# Patient Record
Sex: FEMALE | Race: WHITE | Employment: FULL TIME | URBAN - METROPOLITAN AREA
[De-identification: names, ages, dates, MRNs, and addresses within clinical notes are randomized per-mention and may not be internally consistent; named-entity substitution may affect disease eponyms.]

---

## 2018-10-25 NOTE — PROGRESS NOTES
Celeste De La Rosa presents today for   Chief Complaint   Patient presents with    New Patient     Establishing care today.  Cough     x 3 weeks without fever or chills. Patient reports some improvement    Anxiety     Patient requesting prescription refill of Ativan for anxiety. Patient states that she is not depressed, and the last time she took ativan was 2011. Depression Screening:  PHQ over the last two weeks 10/26/2018   Little interest or pleasure in doing things Nearly every day   Feeling down, depressed, irritable, or hopeless More than half the days   Total Score PHQ 2 5   Trouble falling or staying asleep, or sleeping too much Nearly every day   Feeling tired or having little energy Nearly every day   Poor appetite, weight loss, or overeating Nearly every day   Feeling bad about yourself - or that you are a failure or have let yourself or your family down Nearly every day   Trouble concentrating on things such as school, work, reading, or watching TV Several days   Moving or speaking so slowly that other people could have noticed; or the opposite being so fidgety that others notice Not at all   Thoughts of being better off dead, or hurting yourself in some way Not at all   PHQ 9 Score 18   How difficult have these problems made it for you to do your work, take care of your home and get along with others Somewhat difficult       Learning Assessment:  Learning Assessment 10/26/2018   PRIMARY LEARNER Patient   HIGHEST LEVEL OF EDUCATION - PRIMARY LEARNER  SOME COLLEGE   BARRIERS PRIMARY LEARNER NONE   CO-LEARNER CAREGIVER No   PRIMARY LANGUAGE ENGLISH   LEARNER PREFERENCE PRIMARY DEMONSTRATION   ANSWERED BY patient   RELATIONSHIP SELF       Abuse Screening:  Abuse Screening Questionnaire 10/26/2018   Do you ever feel afraid of your partner? N   Are you in a relationship with someone who physically or mentally threatens you? N   Is it safe for you to go home?  Rigo Guardado

## 2018-10-26 ENCOUNTER — OFFICE VISIT (OUTPATIENT)
Dept: INTERNAL MEDICINE CLINIC | Age: 28
End: 2018-10-26

## 2018-10-26 VITALS
TEMPERATURE: 98.2 F | HEART RATE: 77 BPM | BODY MASS INDEX: 36.7 KG/M2 | DIASTOLIC BLOOD PRESSURE: 73 MMHG | RESPIRATION RATE: 16 BRPM | WEIGHT: 199.4 LBS | HEIGHT: 62 IN | SYSTOLIC BLOOD PRESSURE: 126 MMHG

## 2018-10-26 DIAGNOSIS — J35.8 TONSILLAR EXUDATE: ICD-10-CM

## 2018-10-26 DIAGNOSIS — Z00.00 ROUTINE GENERAL MEDICAL EXAMINATION AT A HEALTH CARE FACILITY: ICD-10-CM

## 2018-10-26 DIAGNOSIS — R05.9 COUGH: ICD-10-CM

## 2018-10-26 DIAGNOSIS — R53.83 FATIGUE, UNSPECIFIED TYPE: ICD-10-CM

## 2018-10-26 DIAGNOSIS — Z00.00 ROUTINE GENERAL MEDICAL EXAMINATION AT A HEALTH CARE FACILITY: Primary | ICD-10-CM

## 2018-10-26 DIAGNOSIS — J45.901 MODERATE ASTHMA WITH EXACERBATION, UNSPECIFIED WHETHER PERSISTENT: ICD-10-CM

## 2018-10-26 DIAGNOSIS — F41.9 MODERATE ANXIETY: ICD-10-CM

## 2018-10-26 DIAGNOSIS — J02.9 SORE THROAT: ICD-10-CM

## 2018-10-26 DIAGNOSIS — R09.81 NASAL CONGESTION: ICD-10-CM

## 2018-10-26 DIAGNOSIS — F32.A MODERATELY SEVERE DEPRESSION: ICD-10-CM

## 2018-10-26 DIAGNOSIS — Z12.4 PAP SMEAR FOR CERVICAL CANCER SCREENING: ICD-10-CM

## 2018-10-26 LAB
S PYO AG THROAT QL: NORMAL
VALID INTERNAL CONTROL?: YES

## 2018-10-26 RX ORDER — ALBUTEROL SULFATE 90 UG/1
2 AEROSOL, METERED RESPIRATORY (INHALATION)
Qty: 1 INHALER | Refills: 1 | Status: SHIPPED | OUTPATIENT
Start: 2018-10-26

## 2018-10-26 RX ORDER — BENZONATATE 100 MG/1
100 CAPSULE ORAL
Qty: 30 CAP | Refills: 1 | Status: SHIPPED | OUTPATIENT
Start: 2018-10-26 | End: 2018-11-02

## 2018-10-26 RX ORDER — HYDROCODONE POLISTIREX AND CHLORPHENIRAMINE POLISTIREX 10; 8 MG/5ML; MG/5ML
5 SUSPENSION, EXTENDED RELEASE ORAL
Qty: 115 ML | Refills: 0 | Status: SHIPPED | OUTPATIENT
Start: 2018-10-26

## 2018-10-26 RX ORDER — LORAZEPAM 0.5 MG/1
TABLET ORAL
COMMUNITY
End: 2018-10-26 | Stop reason: ALTCHOICE

## 2018-10-26 RX ORDER — HYDROXYZINE PAMOATE 25 MG/1
25 CAPSULE ORAL
Qty: 60 CAP | Refills: 1 | Status: SHIPPED | OUTPATIENT
Start: 2018-10-26 | End: 2018-11-09

## 2018-10-26 RX ORDER — PREDNISONE 20 MG/1
TABLET ORAL
Qty: 10 TAB | Refills: 0 | Status: SHIPPED | OUTPATIENT
Start: 2018-10-26

## 2018-10-26 NOTE — PROGRESS NOTES
Harish Lawler is a 34 y.o.  female and presents with    Chief Complaint   Patient presents with    New Patient     Establishing care today.  Cough     x 3 weeks without fever or chills. Patient reports some improvement    Anxiety     Patient requesting prescription refill of Ativan for anxiety. Patient states that she is not depressed, and the last time she took ativan was 2011. Subjective:  HPI   Mrs. Elisa Durant presents today to establish care. She lives with her . 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 Her  works for technical services. She has a history of asthma, anxiety, and obesity. She reports history of generalized anxiety related to work stress from November 2017-May 2018. She was placed on Celexa. She reports rare occurrence of anxiety now that she has quit her corporate job and working from home. She is asking for an as needed anxiety medication. She took Ativan in the past with relief. Mrs. Elisa Durant presents today with x 2 weeks of coughing. First week was tired, chest tightness, unable to get out of bed. The cough is persistent improved over the last two weeks, cough has always been nonproductive. Denies fever, chills, gi symptoms. She has been taking Dayquil and Nyquil with some relief, helped sleep. She does not have an inhaler. HX of asthma, COPD, denies hx smoking. Her parents did smoke. She does work with chickens. Asthma  Patient presents for evaluation of wheezing, non-productive cough and chest tightness. The patient has been previously diagnosed with asthma. Symptoms currently include wheezing, non-productive cough and chest tightness and occur daily. Observed precipitants include infection. Does she do nebulizer treatments? no   Does she use an inhaler? yes and needs refill   Does she use a spacer w/MDIs? no  Does she monitor peak flow rates? no     Nighttime awakenings?  yes Interference with normal activity? yes   PFTs performed? no    Followed by a pulmonary specialist? 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 Would like a referral to gynecology. She is overdue for pap smear. She reports mother with a form of uterine cancer with hysterectomy. Additional Concerns: none     ROS   Review of Systems   Constitutional: Positive for malaise/fatigue. Negative for chills and fever. HENT: Negative. Eyes: Negative. Respiratory: Positive for cough, shortness of breath and wheezing. Negative for hemoptysis and sputum production. Cardiovascular: Negative. Gastrointestinal: Negative. Genitourinary: Negative. Musculoskeletal: Negative. Skin: Negative. Neurological: Negative. Endo/Heme/Allergies: Positive for environmental allergies. Psychiatric/Behavioral: Negative for depression, hallucinations, memory loss, substance abuse and suicidal ideas. The patient is nervous/anxious. The patient does not have insomnia. No Known Allergies    Current Outpatient Medications   Medication Sig Dispense Refill    albuterol (PROVENTIL HFA, VENTOLIN HFA, PROAIR HFA) 90 mcg/actuation inhaler Take 2 Puffs by inhalation every four (4) hours as needed for Wheezing. 1 Inhaler 1    benzonatate (TESSALON) 100 mg capsule Take 1 Cap by mouth three (3) times daily as needed for Cough for up to 7 days. 30 Cap 1    chlorpheniramine-HYDROcodone (TUSSIONEX) 10-8 mg/5 mL suspension Take 5 mL by mouth every twelve (12) hours as needed for Cough. Max Daily Amount: 10 mL. 115 mL 0    predniSONE (DELTASONE) 20 mg tablet 2 tablets by mouth for five days 10 Tab 0    hydrOXYzine pamoate (VISTARIL) 25 mg capsule Take 1 Cap by mouth three (3) times daily as needed for Anxiety for up to 14 days. 60 Cap 1    inhalational spacing device 1 Each by Does Not Apply route as needed.  1 Device 0       Social History     Socioeconomic History    Marital status:      Spouse name: Not on file    Number of children: Not on file    Years of education: Not on file    Highest education level: Not on file   Social Needs    Financial resource strain: Not on file    Food insecurity - worry: Not on file    Food insecurity - inability: Not on file    Transportation needs - medical: Not on file   [employer] needs - non-medical: Not on file   Occupational History    Not on file   Tobacco Use    Smoking status: Current Every Day Smoker     Years: 0.00    Smokeless tobacco: Never Used   Substance and Sexual Activity    Alcohol use: No    Drug use: No    Sexual activity: Yes     Partners: Male     Birth control/protection: Condom   Other Topics Concern    Not on file   Social History Narrative    Not on file       Past Medical History:   Diagnosis Date    Asthma     Double, uterus     History of seasonal allergies     Morbid obesity ([facility] [address]     Pneumonia     Psoriasis of scalp        No past surgical history on file. Family History   Problem Relation Age of Onset    Diabetes Mother     Hypertension Mother     Heart Disease Mother     Cancer Mother         hysterectomy, cancer?  Heart Disease Father         hx of MI, 54    Heart Disease Maternal Uncle     Lung Disease Maternal Grandmother     Cancer Maternal Grandfather         mesothelioma       Objective:  Vitals:    10/26/18 0856   BP: 126/73   Pulse: 77   Resp: 16   Temp: 98.2 °F (36.8 °C)   TempSrc: Oral   Weight: 199 lb 6.4 oz (90.4 kg)   Height: 5' 2\" (1.575 m)   PainSc:   0 - No pain       LABS   none    TESTS  none    PE  Physical Exam   Constitutional: She is oriented to person, place, and time. She appears well-developed and well-nourished. No distress. HENT:   Head: Normocephalic and atraumatic. Right Ear: External ear normal.   Left Ear: External ear normal.   Mouth/Throat: Oropharyngeal exudate present.    Erythematous oropharynx with exudate, no cervical lymphadenopathy, boggy turbinates right worse than left with drainage noted   Eyes: Conjunctivae and EOM are normal. Pupils are equal, round, and reactive to light. Right eye exhibits no discharge. Left eye exhibits no discharge. Neck: Normal range of motion. Cardiovascular: Normal rate, regular rhythm, normal heart sounds and intact distal pulses. Pulmonary/Chest: No tachypnea and no bradypnea. She has no decreased breath sounds. She has wheezes in the right upper field, the right middle field and the right lower field. She has no rales. She exhibits no tenderness. Abdominal: Soft. Bowel sounds are normal.   Musculoskeletal: Normal range of motion. Lymphadenopathy:     She has no cervical adenopathy. Neurological: She is alert and oriented to person, place, and time. No cranial nerve deficit. Skin: Skin is warm and dry. She is not diaphoretic. Psychiatric: She has a normal mood and affect. Her behavior is normal. Judgment and thought content normal.   Vitals reviewed. Assessment/Plan:    1. Establish care- Labs today, CPE in two weeks, referral to [facility] for routine screening paps, reports double uterus, mother with hysterectomy secondary to cancer. 2. Cough, cold like symptoms, fatigue, wheezing x 2 weeks- Asthma exacerbation with diffuse expiratory wheezing to Right lung fields prescribed Albuterol with spacer,  Prednisone 40 mg x 5 days. She is with exudate to oropharynx Rapid strep negative, throat culture sent, Ebstein kwan ordered may have overlapping mononucleosis. Symptomatic treatment discussed. She has a history of environmental allergies as well and may need preventative treatment in future, will get PFTs when better. She does work with chickens so need to keep this in mind if no resolution. 3. Obesity- will discuss further on follow ups. 4. Depression/Anxiety- Will place the patient on Atarax PRN anxiety for now. Patient to fill out PHQ9 and GAD7 in office, will discuss at follow up.  PHQ9 18, moderate severe depression, ARTURO 7- 10 moderate anxiety, I feel she would do better on maintenance medication with this screening, will discuss at follow up. Lab review: orders written for new lab studies as appropriate; see orders    Today's Visit:   Diagnoses and all orders for this visit:    1. Routine general medical examination at a health care facility  -     CBC WITH AUTOMATED DIFF; Future  -     METABOLIC PANEL, COMPREHENSIVE; Future  -     HEMOGLOBIN A1C WITH EAG; Future  -     LIPID PANEL; Future  -     TSH 3RD GENERATION; Future  -     URINALYSIS W/ RFLX MICROSCOPIC; Future    2. Moderate asthma with exacerbation, unspecified whether persistent  -     albuterol (PROVENTIL HFA, VENTOLIN HFA, PROAIR HFA) 90 mcg/actuation inhaler; Take 2 Puffs by inhalation every four (4) hours as needed for Wheezing.  -     benzonatate (TESSALON) 100 mg capsule; Take 1 Cap by mouth three (3) times daily as needed for Cough for up to 7 days. -     chlorpheniramine-HYDROcodone (TUSSIONEX) 10-8 mg/5 mL suspension; Take 5 mL by mouth every twelve (12) hours as needed for Cough. Max Daily Amount: 10 mL. -     predniSONE (DELTASONE) 20 mg tablet; 2 tablets by mouth for five days  -     inhalational spacing device; 1 Each by Does Not Apply route as needed. 3. Cough  -     albuterol (PROVENTIL HFA, VENTOLIN HFA, PROAIR HFA) 90 mcg/actuation inhaler; Take 2 Puffs by inhalation every four (4) hours as needed for Wheezing.  -     benzonatate (TESSALON) 100 mg capsule; Take 1 Cap by mouth three (3) times daily as needed for Cough for up to 7 days. -     chlorpheniramine-HYDROcodone (TUSSIONEX) 10-8 mg/5 mL suspension; Take 5 mL by mouth every twelve (12) hours as needed for Cough. Max Daily Amount: 10 mL. 4. Nasal congestion  -     chlorpheniramine-HYDROcodone (TUSSIONEX) 10-8 mg/5 mL suspension; Take 5 mL by mouth every twelve (12) hours as needed for Cough. Max Daily Amount: 10 mL.     5. Sore throat  -     HEATHER BARR VIRUS AB PANEL; Future    6. Tonsillar exudate  -     AMB POC RAPID STREP A  -     THROAT CULTURE; Future  -     HEATHER BARR VIRUS AB PANEL; Future    7. Fatigue, unspecified type  -     HEATHER BARR VIRUS AB PANEL; Future    8. Pap smear for cervical cancer screening  -     REFERRAL TO GYNECOLOGY    9. Moderately severe depression ([address]    10. Moderate anxiety    Other orders  -     hydrOXYzine pamoate (VISTARIL) 25 mg capsule; Take 1 Cap by mouth three (3) times daily as needed for Anxiety for up to 14 days. Health Maintenance: She reports influenza completed at prior employer this year. She reports received HPV vaccines thru pediatrician. She will need Pneumococcal 23 and Tdap next visit, she is over due for pap. I have discussed the diagnosis with the patient and the intended plan as seen in the above orders. The patient has received an after-visit summary and questions were answered concerning future plans. I have discussed medication side effects and warnings with the patient as well. I have reviewed the plan of care with the patient, accepted their input and they are in agreement with the treatment goals. Follow-up Disposition: Not on File   More than 1/2 of this 60 minute visit was spent in counseling and coordination of care, as described above.     KYLAH Su  Internist of [address], [address] [place] [address].  Phone: [phone]  Fax: [phone]

## 2018-10-28 LAB — RESULT: NORMAL

## 2018-10-29 ENCOUNTER — TELEPHONE (OUTPATIENT)
Dept: INTERNAL MEDICINE CLINIC | Age: 28
End: 2018-10-29

## 2018-12-03 ENCOUNTER — TELEPHONE (OUTPATIENT)
Dept: INTERNAL MEDICINE CLINIC | Age: 28
End: 2018-12-03

## 2018-12-03 NOTE — TELEPHONE ENCOUNTER
Called patient to follow up on referral that was placed to [facility]. Received fax from [facility] and they have been unsuccessful in reaching the patient to get scheduled. If patient returns call have her give [facility] at call at [phone] to schedule appointment.   Closed referral.